# Patient Record
Sex: FEMALE | Race: WHITE | NOT HISPANIC OR LATINO | Employment: OTHER | URBAN - METROPOLITAN AREA
[De-identification: names, ages, dates, MRNs, and addresses within clinical notes are randomized per-mention and may not be internally consistent; named-entity substitution may affect disease eponyms.]

---

## 2023-06-09 DIAGNOSIS — E55.9 VITAMIN D DEFICIENCY: ICD-10-CM

## 2023-06-09 DIAGNOSIS — E78.5 DYSLIPIDEMIA: ICD-10-CM

## 2023-06-09 DIAGNOSIS — R73.09 ELEVATED GLUCOSE: ICD-10-CM

## 2023-08-04 ENCOUNTER — LAB (OUTPATIENT)
Dept: LAB | Facility: LAB | Age: 73
End: 2023-08-04
Payer: MEDICARE

## 2023-08-04 DIAGNOSIS — E55.9 VITAMIN D DEFICIENCY: ICD-10-CM

## 2023-08-04 DIAGNOSIS — E78.5 DYSLIPIDEMIA: ICD-10-CM

## 2023-08-04 DIAGNOSIS — R73.09 ELEVATED GLUCOSE: ICD-10-CM

## 2023-08-04 LAB
ALANINE AMINOTRANSFERASE (SGPT) (U/L) IN SER/PLAS: 122 U/L (ref 7–45)
ALBUMIN (G/DL) IN SER/PLAS: 4.4 G/DL (ref 3.4–5)
ALKALINE PHOSPHATASE (U/L) IN SER/PLAS: 83 U/L (ref 33–136)
ANION GAP IN SER/PLAS: 11 MMOL/L (ref 10–20)
ASPARTATE AMINOTRANSFERASE (SGOT) (U/L) IN SER/PLAS: 63 U/L (ref 9–39)
BASOPHILS (10*3/UL) IN BLOOD BY AUTOMATED COUNT: 0.05 X10E9/L (ref 0–0.1)
BASOPHILS/100 LEUKOCYTES IN BLOOD BY AUTOMATED COUNT: 0.8 % (ref 0–2)
BILIRUBIN TOTAL (MG/DL) IN SER/PLAS: 1.4 MG/DL (ref 0–1.2)
CALCIUM (MG/DL) IN SER/PLAS: 9.7 MG/DL (ref 8.6–10.3)
CARBON DIOXIDE, TOTAL (MMOL/L) IN SER/PLAS: 29 MMOL/L (ref 21–32)
CHLORIDE (MMOL/L) IN SER/PLAS: 103 MMOL/L (ref 98–107)
CHOLESTEROL (MG/DL) IN SER/PLAS: 256 MG/DL (ref 0–199)
CHOLESTEROL IN HDL (MG/DL) IN SER/PLAS: 75.9 MG/DL
CHOLESTEROL/HDL RATIO: 3.4
CREATININE (MG/DL) IN SER/PLAS: 0.87 MG/DL (ref 0.5–1.05)
EOSINOPHILS (10*3/UL) IN BLOOD BY AUTOMATED COUNT: 0.2 X10E9/L (ref 0–0.4)
EOSINOPHILS/100 LEUKOCYTES IN BLOOD BY AUTOMATED COUNT: 3.3 % (ref 0–6)
ERYTHROCYTE DISTRIBUTION WIDTH (RATIO) BY AUTOMATED COUNT: 13.8 % (ref 11.5–14.5)
ERYTHROCYTE MEAN CORPUSCULAR HEMOGLOBIN CONCENTRATION (G/DL) BY AUTOMATED: 33.1 G/DL (ref 32–36)
ERYTHROCYTE MEAN CORPUSCULAR VOLUME (FL) BY AUTOMATED COUNT: 91 FL (ref 80–100)
ERYTHROCYTES (10*6/UL) IN BLOOD BY AUTOMATED COUNT: 4.66 X10E12/L (ref 4–5.2)
GFR FEMALE: 70 ML/MIN/1.73M2
GLUCOSE (MG/DL) IN SER/PLAS: 95 MG/DL (ref 74–99)
HEMATOCRIT (%) IN BLOOD BY AUTOMATED COUNT: 42.3 % (ref 36–46)
HEMOGLOBIN (G/DL) IN BLOOD: 14 G/DL (ref 12–16)
IMMATURE GRANULOCYTES/100 LEUKOCYTES IN BLOOD BY AUTOMATED COUNT: 0.2 % (ref 0–0.9)
LDL: 154 MG/DL (ref 0–99)
LEUKOCYTES (10*3/UL) IN BLOOD BY AUTOMATED COUNT: 6.1 X10E9/L (ref 4.4–11.3)
LYMPHOCYTES (10*3/UL) IN BLOOD BY AUTOMATED COUNT: 1.9 X10E9/L (ref 0.8–3)
LYMPHOCYTES/100 LEUKOCYTES IN BLOOD BY AUTOMATED COUNT: 31.2 % (ref 13–44)
MONOCYTES (10*3/UL) IN BLOOD BY AUTOMATED COUNT: 0.71 X10E9/L (ref 0.05–0.8)
MONOCYTES/100 LEUKOCYTES IN BLOOD BY AUTOMATED COUNT: 11.7 % (ref 2–10)
NEUTROPHILS (10*3/UL) IN BLOOD BY AUTOMATED COUNT: 3.22 X10E9/L (ref 1.6–5.5)
NEUTROPHILS/100 LEUKOCYTES IN BLOOD BY AUTOMATED COUNT: 52.8 % (ref 40–80)
PLATELETS (10*3/UL) IN BLOOD AUTOMATED COUNT: 230 X10E9/L (ref 150–450)
POTASSIUM (MMOL/L) IN SER/PLAS: 4.6 MMOL/L (ref 3.5–5.3)
PROTEIN TOTAL: 7.5 G/DL (ref 6.4–8.2)
SODIUM (MMOL/L) IN SER/PLAS: 138 MMOL/L (ref 136–145)
THYROTROPIN (MIU/L) IN SER/PLAS BY DETECTION LIMIT <= 0.05 MIU/L: 3.43 MIU/L (ref 0.44–3.98)
TRIGLYCERIDE (MG/DL) IN SER/PLAS: 131 MG/DL (ref 0–149)
UREA NITROGEN (MG/DL) IN SER/PLAS: 15 MG/DL (ref 6–23)
VLDL: 26 MG/DL (ref 0–40)

## 2023-08-04 PROCEDURE — 83036 HEMOGLOBIN GLYCOSYLATED A1C: CPT

## 2023-08-04 PROCEDURE — 84443 ASSAY THYROID STIM HORMONE: CPT

## 2023-08-04 PROCEDURE — 36415 COLL VENOUS BLD VENIPUNCTURE: CPT

## 2023-08-04 PROCEDURE — 80053 COMPREHEN METABOLIC PANEL: CPT

## 2023-08-04 PROCEDURE — 80061 LIPID PANEL: CPT

## 2023-08-04 PROCEDURE — 85025 COMPLETE CBC W/AUTO DIFF WBC: CPT

## 2023-08-04 PROCEDURE — 82306 VITAMIN D 25 HYDROXY: CPT

## 2023-08-05 LAB
CALCIDIOL (25 OH VITAMIN D3) (NG/ML) IN SER/PLAS: 55 NG/ML
ESTIMATED AVERAGE GLUCOSE FOR HBA1C: 108 MG/DL
HEMOGLOBIN A1C/HEMOGLOBIN TOTAL IN BLOOD: 5.4 %

## 2023-08-07 PROBLEM — R59.1 LYMPHADENOPATHY: Status: ACTIVE | Noted: 2023-08-07

## 2023-08-07 PROBLEM — J84.10 PULMONARY FIBROSIS (MULTI): Status: ACTIVE | Noted: 2023-08-07

## 2023-08-07 PROBLEM — M77.50 TENDONITIS OF FOOT: Status: ACTIVE | Noted: 2023-08-07

## 2023-08-07 PROBLEM — K21.9 GERD (GASTROESOPHAGEAL REFLUX DISEASE): Status: ACTIVE | Noted: 2023-08-07

## 2023-08-07 PROBLEM — J30.9 ALLERGIC RHINITIS: Status: ACTIVE | Noted: 2023-08-07

## 2023-08-07 PROBLEM — M47.22 OSTEOARTHRITIS OF SPINE WITH RADICULOPATHY, CERVICAL REGION: Status: ACTIVE | Noted: 2023-08-07

## 2023-08-07 PROBLEM — S81.801S OPEN LEG WOUND, RIGHT, SEQUELA: Status: ACTIVE | Noted: 2023-08-07

## 2023-08-07 PROBLEM — L30.9 DERMATITIS, ECZEMATOID: Status: ACTIVE | Noted: 2023-08-07

## 2023-08-07 PROBLEM — R74.01 TRANSAMINITIS: Status: ACTIVE | Noted: 2023-08-07

## 2023-08-07 PROBLEM — J34.2 NOSE SEPTUM DEVIATION: Status: ACTIVE | Noted: 2023-08-07

## 2023-08-07 PROBLEM — E04.1 THYROID NODULE: Status: ACTIVE | Noted: 2019-02-04

## 2023-08-07 PROBLEM — R53.1 WEAKNESS: Status: ACTIVE | Noted: 2023-08-07

## 2023-08-07 PROBLEM — T78.02XA ANAPHYLACTIC SHOCK DUE TO SHELLFISH: Status: ACTIVE | Noted: 2023-08-07

## 2023-08-07 PROBLEM — G47.00 INSOMNIA: Status: ACTIVE | Noted: 2023-08-07

## 2023-08-07 PROBLEM — E66.9 OBESITY: Status: ACTIVE | Noted: 2023-08-07

## 2023-08-07 PROBLEM — L23.89 ALLERGIC CONTACT DERMATITIS DUE TO OTHER AGENTS: Status: ACTIVE | Noted: 2023-08-07

## 2023-08-07 PROBLEM — M25.78 DEGENERATION OF INTERVERTEBRAL DISC OF CERVICAL REGION WITH OSTEOPHYTE OF CERVICAL VERTEBRA: Status: ACTIVE | Noted: 2023-08-07

## 2023-08-07 PROBLEM — B02.9 SHINGLES: Status: ACTIVE | Noted: 2023-08-07

## 2023-08-07 PROBLEM — L72.0 EPIDERMOID CYST OF SKIN: Status: ACTIVE | Noted: 2023-08-07

## 2023-08-07 PROBLEM — R25.2 MUSCLE CRAMPS AT NIGHT: Status: ACTIVE | Noted: 2023-08-07

## 2023-08-07 PROBLEM — M19.079 OSTEOARTHRITIS OF FOOT: Status: ACTIVE | Noted: 2023-08-07

## 2023-08-07 PROBLEM — H10.10 ALLERGIC CONJUNCTIVITIS: Status: ACTIVE | Noted: 2023-08-07

## 2023-08-07 PROBLEM — M50.30 DEGENERATION OF INTERVERTEBRAL DISC OF CERVICAL REGION WITH OSTEOPHYTE OF CERVICAL VERTEBRA: Status: ACTIVE | Noted: 2023-08-07

## 2023-08-07 PROBLEM — E55.9 VITAMIN D DEFICIENCY: Status: ACTIVE | Noted: 2023-08-07

## 2023-08-07 PROBLEM — R06.00 DYSPNEA: Status: ACTIVE | Noted: 2023-08-07

## 2023-08-07 PROBLEM — J44.9 COPD (CHRONIC OBSTRUCTIVE PULMONARY DISEASE) (MULTI): Status: ACTIVE | Noted: 2023-08-07

## 2023-08-07 PROBLEM — K86.9 PANCREATIC LESION (HHS-HCC): Status: ACTIVE | Noted: 2023-08-07

## 2023-08-07 PROBLEM — F41.9 ANXIETY DISORDER: Status: ACTIVE | Noted: 2023-08-07

## 2023-08-07 PROBLEM — R94.2 RESTRICTIVE PATTERN PRESENT ON PULMONARY FUNCTION TESTING: Status: ACTIVE | Noted: 2023-08-07

## 2023-08-07 PROBLEM — R73.09 ELEVATED GLUCOSE: Status: ACTIVE | Noted: 2023-08-07

## 2023-08-07 PROBLEM — E78.5 HYPERLIPIDEMIA: Status: ACTIVE | Noted: 2023-08-07

## 2023-08-07 PROBLEM — M85.80 OSTEOPENIA OF THE ELDERLY: Status: ACTIVE | Noted: 2023-08-07

## 2023-08-07 RX ORDER — LORAZEPAM 0.5 MG/1
TABLET ORAL
COMMUNITY
End: 2023-08-08 | Stop reason: WASHOUT

## 2023-08-07 RX ORDER — VENLAFAXINE HYDROCHLORIDE 150 MG/1
150 CAPSULE, EXTENDED RELEASE ORAL
COMMUNITY
End: 2023-08-08 | Stop reason: WASHOUT

## 2023-08-07 RX ORDER — SULFACETAMIDE SODIUM, SULFUR 100; 50 MG/G; MG/G
EMULSION TOPICAL
COMMUNITY
Start: 2014-05-21 | End: 2023-08-08 | Stop reason: WASHOUT

## 2023-08-07 RX ORDER — ACETAMINOPHEN 500 MG
TABLET ORAL 2 TIMES DAILY
COMMUNITY
End: 2023-08-08 | Stop reason: WASHOUT

## 2023-08-07 RX ORDER — BUDESONIDE AND FORMOTEROL FUMARATE DIHYDRATE 160; 4.5 UG/1; UG/1
2 AEROSOL RESPIRATORY (INHALATION) 2 TIMES DAILY
COMMUNITY
End: 2023-08-08 | Stop reason: WASHOUT

## 2023-08-07 RX ORDER — EPINEPHRINE 0.3 MG/.3ML
0.3 INJECTION SUBCUTANEOUS
COMMUNITY
Start: 2021-05-21 | End: 2023-08-08 | Stop reason: WASHOUT

## 2023-08-07 RX ORDER — ASPIRIN 325 MG
50000 TABLET, DELAYED RELEASE (ENTERIC COATED) ORAL
COMMUNITY
End: 2023-08-08 | Stop reason: SDUPTHER

## 2023-08-07 RX ORDER — CETIRIZINE HYDROCHLORIDE 10 MG/1
TABLET ORAL
COMMUNITY

## 2023-08-07 RX ORDER — FLUOCINONIDE 0.5 MG/G
OINTMENT TOPICAL
COMMUNITY
Start: 2014-05-21 | End: 2023-08-08 | Stop reason: WASHOUT

## 2023-08-07 RX ORDER — TRAZODONE HYDROCHLORIDE 50 MG/1
TABLET ORAL
COMMUNITY
End: 2023-08-08 | Stop reason: WASHOUT

## 2023-08-07 RX ORDER — PRAVASTATIN SODIUM 40 MG/1
1 TABLET ORAL DAILY
COMMUNITY
Start: 2018-06-27 | End: 2023-08-08 | Stop reason: WASHOUT

## 2023-08-07 RX ORDER — ARIPIPRAZOLE 5 MG/1
5 TABLET ORAL
COMMUNITY
End: 2023-08-08 | Stop reason: WASHOUT

## 2023-08-07 RX ORDER — VIT C/E/ZN/COPPR/LUTEIN/ZEAXAN 250MG-90MG
1000 CAPSULE ORAL
COMMUNITY
End: 2023-08-08 | Stop reason: WASHOUT

## 2023-08-07 RX ORDER — PERMETHRIN 50 MG/G
CREAM TOPICAL
COMMUNITY
Start: 2020-11-30 | End: 2023-08-08 | Stop reason: WASHOUT

## 2023-08-07 RX ORDER — ANASTROZOLE 1 MG/1
1 TABLET ORAL
COMMUNITY
Start: 2017-02-20 | End: 2023-08-08 | Stop reason: WASHOUT

## 2023-08-08 ENCOUNTER — OFFICE VISIT (OUTPATIENT)
Dept: PRIMARY CARE | Facility: CLINIC | Age: 73
End: 2023-08-08
Payer: MEDICARE

## 2023-08-08 VITALS
TEMPERATURE: 97.2 F | HEIGHT: 66 IN | OXYGEN SATURATION: 96 % | SYSTOLIC BLOOD PRESSURE: 120 MMHG | HEART RATE: 75 BPM | BODY MASS INDEX: 40.82 KG/M2 | WEIGHT: 254 LBS | DIASTOLIC BLOOD PRESSURE: 66 MMHG

## 2023-08-08 DIAGNOSIS — J43.9 PULMONARY EMPHYSEMA, UNSPECIFIED EMPHYSEMA TYPE (MULTI): ICD-10-CM

## 2023-08-08 DIAGNOSIS — E66.01 OBESITY, MORBID (MULTI): ICD-10-CM

## 2023-08-08 DIAGNOSIS — E78.2 MIXED HYPERLIPIDEMIA: ICD-10-CM

## 2023-08-08 DIAGNOSIS — Z00.00 ROUTINE GENERAL MEDICAL EXAMINATION AT HEALTH CARE FACILITY: Primary | ICD-10-CM

## 2023-08-08 DIAGNOSIS — E55.9 VITAMIN D DEFICIENCY: ICD-10-CM

## 2023-08-08 PROCEDURE — 1160F RVW MEDS BY RX/DR IN RCRD: CPT | Performed by: INTERNAL MEDICINE

## 2023-08-08 PROCEDURE — 1036F TOBACCO NON-USER: CPT | Performed by: INTERNAL MEDICINE

## 2023-08-08 PROCEDURE — 1126F AMNT PAIN NOTED NONE PRSNT: CPT | Performed by: INTERNAL MEDICINE

## 2023-08-08 PROCEDURE — 1170F FXNL STATUS ASSESSED: CPT | Performed by: INTERNAL MEDICINE

## 2023-08-08 PROCEDURE — G0439 PPPS, SUBSEQ VISIT: HCPCS | Performed by: INTERNAL MEDICINE

## 2023-08-08 PROCEDURE — 3008F BODY MASS INDEX DOCD: CPT | Performed by: INTERNAL MEDICINE

## 2023-08-08 PROCEDURE — 1159F MED LIST DOCD IN RCRD: CPT | Performed by: INTERNAL MEDICINE

## 2023-08-08 RX ORDER — ASPIRIN 325 MG
50000 TABLET, DELAYED RELEASE (ENTERIC COATED) ORAL
Qty: 12 CAPSULE | Refills: 1 | Status: SHIPPED | OUTPATIENT
Start: 2023-08-08 | End: 2024-02-04

## 2023-08-08 RX ORDER — PRAVASTATIN SODIUM 40 MG/1
40 TABLET ORAL DAILY
Qty: 90 TABLET | Refills: 1 | Status: SHIPPED | OUTPATIENT
Start: 2023-08-08 | End: 2024-02-04

## 2023-08-08 ASSESSMENT — ENCOUNTER SYMPTOMS
ACTIVITY CHANGE: 0
UNEXPECTED WEIGHT CHANGE: 1
CONSTIPATION: 0
DIFFICULTY URINATING: 0
DIARRHEA: 0
HALLUCINATIONS: 0
CHEST TIGHTNESS: 0
APPETITE CHANGE: 0
BLOOD IN STOOL: 0
NAUSEA: 0
FACIAL ASYMMETRY: 0
JOINT SWELLING: 0
TROUBLE SWALLOWING: 0
NERVOUS/ANXIOUS: 0
FLANK PAIN: 0
HEADACHES: 0
SLEEP DISTURBANCE: 0
SHORTNESS OF BREATH: 0
VOICE CHANGE: 0
WEAKNESS: 0
PALPITATIONS: 0
SEIZURES: 0
FEVER: 0
STRIDOR: 0
WHEEZING: 0
EYE PAIN: 0
WOUND: 0
COUGH: 0
DIZZINESS: 0
FATIGUE: 0
SPEECH DIFFICULTY: 0
PHOTOPHOBIA: 0
NECK PAIN: 0
POLYDIPSIA: 0
TREMORS: 0
BACK PAIN: 0
ADENOPATHY: 0
SORE THROAT: 0
VOMITING: 0
POLYPHAGIA: 0
DYSURIA: 0
MYALGIAS: 0
NUMBNESS: 0
SINUS PAIN: 0
ARTHRALGIAS: 0
CONFUSION: 0
ABDOMINAL PAIN: 0
HEMATURIA: 0

## 2023-08-08 ASSESSMENT — PATIENT HEALTH QUESTIONNAIRE - PHQ9
2. FEELING DOWN, DEPRESSED OR HOPELESS: NOT AT ALL
SUM OF ALL RESPONSES TO PHQ9 QUESTIONS 1 AND 2: 0
1. LITTLE INTEREST OR PLEASURE IN DOING THINGS: NOT AT ALL

## 2023-08-08 ASSESSMENT — PAIN SCALES - GENERAL: PAINLEVEL: 0-NO PAIN

## 2023-08-08 ASSESSMENT — ACTIVITIES OF DAILY LIVING (ADL)
ADEQUATE_TO_COMPLETE_ADL: YES
BATHING: INDEPENDENT
DOING_HOUSEWORK: INDEPENDENT
TAKING_MEDICATION: INDEPENDENT
GROCERY_SHOPPING: INDEPENDENT
DRESSING: INDEPENDENT
FEEDING: INDEPENDENT
TOILETING: INDEPENDENT
MANAGING_FINANCES: INDEPENDENT
JUDGMENT_ADEQUATE_SAFELY_COMPLETE_DAILY_ACTIVITIES: YES

## 2023-08-08 NOTE — PROGRESS NOTES
Subjective   Reason for Visit: Susan Lezama is an 73 y.o. female here for a Medicare Wellness visit.     Past Medical, Surgical, and Family History reviewed and updated in chart.    Reviewed all medications by prescribing practitioner or clinical pharmacist (such as prescriptions, OTCs, herbal therapies and supplements) and documented in the medical record.    HPI  She ran out of medications and they have been ordered.  She denied for any specific symptoms other than gaining weight because of dietary non compliance and increased portion is the problem here.  She has moved to [place] and is here today for Annual wellness exam and to get refill on the medication.    Patient Care Team:  Tone Chen MD as PCP - General     Review of Systems   Constitutional:  Positive for unexpected weight change. Negative for activity change, appetite change, fatigue and fever.   HENT:  Negative for dental problem, ear discharge, hearing loss, nosebleeds, postnasal drip, sinus pain, sore throat, trouble swallowing and voice change.    Eyes:  Negative for photophobia, pain and visual disturbance.   Respiratory:  Negative for cough, chest tightness, shortness of breath, wheezing and stridor.    Cardiovascular:  Negative for chest pain, palpitations and leg swelling.   Gastrointestinal:  Negative for abdominal pain, blood in stool, constipation, diarrhea, nausea and vomiting.   Endocrine: Negative for polydipsia, polyphagia and polyuria.   Genitourinary:  Negative for decreased urine volume, difficulty urinating, dyspareunia, dysuria, flank pain, hematuria and urgency.   Musculoskeletal:  Negative for arthralgias, back pain, gait problem, joint swelling, myalgias and neck pain.   Skin:  Negative for rash and wound.   Allergic/Immunologic: Negative for environmental allergies and food allergies.   Neurological:  Negative for dizziness, tremors, seizures, syncope, facial asymmetry, speech difficulty, weakness, numbness and  "headaches.   Hematological:  Negative for adenopathy.   Psychiatric/Behavioral:  Negative for behavioral problems, confusion, hallucinations, sleep disturbance and suicidal ideas. The patient is not nervous/anxious.      Objective   Vitals:  /66   Pulse 75   Temp 36.2 °C (97.2 °F)   Ht 1.676 m (5' 6\")   Wt 115 kg (254 lb)   SpO2 96%   BMI 41.00 kg/m²       Physical Exam  Constitutional:       General: She is not in acute distress.     Appearance: Normal appearance. She is obese. She is not ill-appearing or toxic-appearing.   HENT:      Head: Normocephalic and atraumatic.      Nose: Nose normal.   Eyes:      General:         Right eye: No discharge.         Left eye: No discharge.      Extraocular Movements: Extraocular movements intact.      Conjunctiva/sclera: Conjunctivae normal.      Pupils: Pupils are equal, round, and reactive to light.   Cardiovascular:      Rate and Rhythm: Normal rate and regular rhythm.      Pulses: Normal pulses.      Heart sounds: Normal heart sounds. No murmur heard.     No gallop.   Pulmonary:      Effort: Pulmonary effort is normal. No respiratory distress.      Breath sounds: Normal breath sounds. No stridor. No wheezing or rales.   Abdominal:      General: Bowel sounds are normal. There is no distension.      Palpations: Abdomen is soft.      Tenderness: There is no abdominal tenderness. There is no right CVA tenderness, left CVA tenderness, guarding or rebound.   Musculoskeletal:         General: No swelling or deformity. Normal range of motion.      Cervical back: Normal range of motion and neck supple. No rigidity or tenderness.      Right lower leg: No edema.      Left lower leg: No edema.   Skin:     General: Skin is warm.      Coloration: Skin is not jaundiced.      Findings: No bruising, erythema or rash.   Neurological:      General: No focal deficit present.      Mental Status: She is alert and oriented to person, place, and time.      Cranial Nerves: No cranial " nerve deficit.      Gait: Gait normal.   Psychiatric:         Mood and Affect: Mood normal.         Behavior: Behavior normal.         Thought Content: Thought content normal.         Judgment: Judgment normal.       Assessment/Plan   Problem List Items Addressed This Visit          Cardiac and Vasculature    Hyperlipidemia    Current Assessment & Plan     Pravastatin has been renewed.         Relevant Medications    pravastatin (Pravachol) 40 mg tablet       Endocrine/Metabolic    Obesity, morbid (CMS/HCC)    Current Assessment & Plan     - Lifestyle modification which include daily exercise at least for 45 minute and Low Calorie intake of 1800- 2000 Kcal per day.         Vitamin D deficiency    Relevant Medications    cholecalciferol (Vitamin D-3) 1,250 mcg (50,000 unit) capsule       Pulmonary and Pneumonias    COPD (chronic obstructive pulmonary disease) (CMS/[facility])    Current Assessment & Plan     Stable and not short of breath at this time.          Other Visit Diagnoses       Routine general medical examination at health care facility    -  Primary